# Patient Record
Sex: FEMALE | Race: WHITE | NOT HISPANIC OR LATINO | URBAN - METROPOLITAN AREA
[De-identification: names, ages, dates, MRNs, and addresses within clinical notes are randomized per-mention and may not be internally consistent; named-entity substitution may affect disease eponyms.]

---

## 2017-01-11 ENCOUNTER — TELEPHONE (OUTPATIENT)
Dept: NEUROSURGERY | Facility: CLINIC | Age: 62
End: 2017-01-11

## 2017-01-11 ENCOUNTER — OFFICE VISIT (OUTPATIENT)
Dept: NEUROSURGERY | Facility: CLINIC | Age: 62
End: 2017-01-11
Payer: COMMERCIAL

## 2017-01-11 VITALS
HEART RATE: 77 BPM | HEIGHT: 68 IN | DIASTOLIC BLOOD PRESSURE: 96 MMHG | BODY MASS INDEX: 35.61 KG/M2 | SYSTOLIC BLOOD PRESSURE: 150 MMHG | TEMPERATURE: 98 F | WEIGHT: 235 LBS

## 2017-01-11 DIAGNOSIS — M46.92 CERVICAL SPONDYLITIS: Primary | ICD-10-CM

## 2017-01-11 DIAGNOSIS — I10 ESSENTIAL HYPERTENSION: ICD-10-CM

## 2017-01-11 DIAGNOSIS — M50.222 HERNIATED NUCLEUS PULPOSUS, C5-6: Primary | ICD-10-CM

## 2017-01-11 DIAGNOSIS — M54.12 CERVICAL RADICULOPATHY AT C6: ICD-10-CM

## 2017-01-11 PROCEDURE — 1159F MED LIST DOCD IN RCRD: CPT | Mod: S$GLB,,, | Performed by: NEUROLOGICAL SURGERY

## 2017-01-11 PROCEDURE — 99999 PR PBB SHADOW E&M-NEW PATIENT-LVL III: CPT | Mod: PBBFAC,,, | Performed by: NEUROLOGICAL SURGERY

## 2017-01-11 PROCEDURE — 3077F SYST BP >= 140 MM HG: CPT | Mod: S$GLB,,, | Performed by: NEUROLOGICAL SURGERY

## 2017-01-11 PROCEDURE — 3080F DIAST BP >= 90 MM HG: CPT | Mod: S$GLB,,, | Performed by: NEUROLOGICAL SURGERY

## 2017-01-11 PROCEDURE — 99204 OFFICE O/P NEW MOD 45 MIN: CPT | Mod: S$GLB,,, | Performed by: NEUROLOGICAL SURGERY

## 2017-01-11 RX ORDER — METFORMIN HYDROCHLORIDE 500 MG/1
TABLET ORAL
COMMUNITY
Start: 2016-10-25

## 2017-01-11 RX ORDER — GABAPENTIN 300 MG/1
900 CAPSULE ORAL NIGHTLY
COMMUNITY
Start: 2016-10-25

## 2017-01-11 RX ORDER — CELECOXIB 200 MG/1
400 CAPSULE ORAL 2 TIMES DAILY
Qty: 30 CAPSULE | Refills: 0 | Status: SHIPPED | OUTPATIENT
Start: 2017-01-11

## 2017-01-11 RX ORDER — DIAZEPAM 2 MG/1
2 TABLET ORAL EVERY 8 HOURS PRN
Qty: 28 TABLET | Refills: 0 | Status: SHIPPED | OUTPATIENT
Start: 2017-01-11 | End: 2017-02-10

## 2017-01-11 RX ORDER — DULOXETIN HYDROCHLORIDE 60 MG/1
CAPSULE, DELAYED RELEASE ORAL
COMMUNITY
Start: 2016-10-25

## 2017-01-11 RX ORDER — PANTOPRAZOLE SODIUM 40 MG/1
TABLET, DELAYED RELEASE ORAL
COMMUNITY
Start: 2016-10-25

## 2017-01-11 RX ORDER — HYDROCHLOROTHIAZIDE 25 MG/1
TABLET ORAL
COMMUNITY
Start: 2016-10-25

## 2017-01-11 RX ORDER — MELOXICAM 15 MG/1
TABLET ORAL
COMMUNITY
Start: 2016-10-25

## 2017-01-11 RX ORDER — ALPRAZOLAM 0.5 MG/1
TABLET ORAL
COMMUNITY
Start: 2016-10-26

## 2017-01-11 RX ORDER — LISINOPRIL 40 MG/1
TABLET ORAL
COMMUNITY
Start: 2016-10-25

## 2017-01-11 RX ORDER — OXYCODONE AND ACETAMINOPHEN 7.5; 325 MG/1; MG/1
TABLET ORAL
COMMUNITY
Start: 2017-01-10

## 2017-01-11 NOTE — MR AVS SNAPSHOT
[facility] - Neurosurgery 7th Fl  [address]  [facility] [address]  Phone: [phone]                  Sarah Beck   2017 8:30 AM   Office Visit    Description:  Female : 1955   Provider:  Shon Alejo MD   Department:  [facility] - Neurosurgery [facility]           Reason for Visit     Lumbar Spine Pain (L-Spine)     Cervical Spine Pain (C-spine)           Diagnoses this Visit        Comments    Cervical radiculopathy at C6                To Do List           Future Appointments        Provider Department Dept Phone    2017 9:20 AM MD Toby Gaines III [facility] - Neurology [phone]    2017 10:20 AM [facility] CT6 MOBILE LIMIT [address] [facility] [phone]    2017 11:30 AM Shon Alejo MD [address] [phone]      Goals (5 Years of Data)     None       These Medications        Disp Refills Start End    diazePAM (VALIUM) 2 MG tablet 28 tablet 0 2017 2/10/2017    Take 1 tablet (2 mg total) by mouth every 8 (eight) hours as needed (Muscle spasms). - Oral    celecoxib (CELEBREX) 200 MG capsule 30 capsule 0 2017     Take 2 capsules (400 mg total) by mouth 2 (two) times daily. Take with meals - Oral      [facility] On Call     [facility] On [facility] Nurse Care Line -  Assistance  Registered nurses in the [facility] Center provide clinical advisement, health education, appointment booking, and other advisory services.  Call for this free service at [phone].             Medications           Message regarding Medications     Verify the changes and/or additions to your medication regime listed below are the same as discussed with your clinician today.  If any of these changes or additions are incorrect, please notify your healthcare provider.        START taking these NEW medications        Refills    diazePAM (VALIUM) 2 MG tablet 0    Sig: Take 1 tablet (2 mg total) by mouth every 8 (eight) hours  "as needed (Muscle spasms).    Class: Print    Route: Oral    celecoxib (CELEBREX) 200 MG capsule 0    Sig: Take 2 capsules (400 mg total) by mouth 2 (two) times daily. Take with meals    Class: Print    Route: Oral           Verify that the below list of medications is an accurate representation of the medications you are currently taking.  If none reported, the list may be blank. If incorrect, please contact your healthcare provider. Carry this list with you in case of emergency.           Current Medications     alprazolam (XANAX) 0.5 MG tablet     celecoxib (CELEBREX) 200 MG capsule Take 2 capsules (400 mg total) by mouth 2 (two) times daily. Take with meals    diazePAM (VALIUM) 2 MG tablet Take 1 tablet (2 mg total) by mouth every 8 (eight) hours as needed (Muscle spasms).    duloxetine (CYMBALTA) 60 MG capsule     gabapentin (NEURONTIN) 300 MG capsule Take 900 mg by mouth nightly.     hydrochlorothiazide (HYDRODIURIL) 25 MG tablet     lisinopril (PRINIVIL,ZESTRIL) 40 MG tablet     meloxicam (MOBIC) 15 MG tablet     metformin (GLUCOPHAGE) 500 MG tablet     oxycodone-acetaminophen (PERCOCET) 7.5-325 mg per tablet     pantoprazole (PROTONIX) 40 MG tablet            Clinical Reference Information           Vital Signs - Last Recorded  Most recent update: 1/11/2017  8:56 AM by Eliseo Dominguez MA    BP Pulse Temp Ht Wt BMI    (!) 150/96 77 98 °F (36.7 °C) 5' 8" (1.727 m) 106.6 kg (235 lb) 35.73 kg/m2      Blood Pressure          Most Recent Value    BP  (!)  150/96      Allergies as of 1/11/2017     Dilaudid [Hydromorphone]    Sulfa (Sulfonamide Antibiotics)      Immunizations Administered on Date of Encounter - 1/11/2017     None      [facility] Sign-Up     Activating your [facility] account is as easy as 1-2-3!     1) Visit [email or web address], select Sign Up Now, enter this activation code and your date of birth, then select Next.  [email or web address]  Expires: 2/25/2017  8:40 AM      2) Create a username and password to use " when you visit [facility] in the future and select a security question in case you lose your password and select Next.    3) Enter your e-mail address and click Sign Up!    Additional Information  If you have questions, please e-mail [email or web address] or call [phone] to talk to our [facility] staff. Remember, [facility] is NOT to be used for urgent needs. For medical emergencies, dial 911.
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
## 2017-01-11 NOTE — LETTER
January 11, 2017      Carole Benson MD  [address] [place] [address]           [facility] - Neurosurgery 7th Fl  [address]  [place] [address]  Phone: [phone]          Patient: Sarah Beck   MR Number: [id number]   YOB: 1955   Date of Visit: 1/11/2017       Dear Dr. Carole Benson:    Thank you for referring Sarah Beck to me for evaluation. Attached you will find relevant portions of my assessment and plan of care.    If you have questions, please do not hesitate to call me. I look forward to following Sarah Beck along with you.    Sincerely,    Shon Alejo MD    Enclosure  CC:  No Recipients    If you would like to receive this communication electronically, please contact [email or web address] or [phone] to request more information on [employer] Link access.    For providers and/or their staff who would like to refer a patient to [facility], please contact us through our one-stop-shop provider referral line, [facility], at [phone].    If you feel you have received this communication in error or would no longer like to receive these types of communications, please e-mail [email or web address]

## 2017-01-25 ENCOUNTER — TELEPHONE (OUTPATIENT)
Dept: NEUROSURGERY | Facility: CLINIC | Age: 62
End: 2017-01-25

## 2017-01-25 DIAGNOSIS — M50.222 HERNIATED NUCLEUS PULPOSUS, C5-6: Primary | ICD-10-CM

## 2017-02-14 ENCOUNTER — TELEPHONE (OUTPATIENT)
Dept: NEUROSURGERY | Facility: CLINIC | Age: 62
End: 2017-02-14

## 2017-02-14 ENCOUNTER — PROCEDURE VISIT (OUTPATIENT)
Dept: NEUROLOGY | Facility: CLINIC | Age: 62
End: 2017-02-14
Payer: COMMERCIAL

## 2017-02-14 DIAGNOSIS — M46.92 CERVICAL SPONDYLITIS: ICD-10-CM

## 2017-02-14 PROCEDURE — 95909 NRV CNDJ TST 5-6 STUDIES: CPT | Mod: S$GLB,,,

## 2017-02-14 PROCEDURE — 95886 MUSC TEST DONE W/N TEST COMP: CPT | Mod: S$GLB,,,

## 2017-02-14 NOTE — PROCEDURES
Procedures     See the copy of this report that has been scanned into patient's Epic Chart.     ED CAREY III, MD